# Patient Record
Sex: FEMALE | Race: WHITE | Employment: FULL TIME | URBAN - METROPOLITAN AREA
[De-identification: names, ages, dates, MRNs, and addresses within clinical notes are randomized per-mention and may not be internally consistent; named-entity substitution may affect disease eponyms.]

---

## 2023-06-27 ENCOUNTER — HOSPITAL ENCOUNTER (OUTPATIENT)
Dept: PHYSICAL THERAPY | Age: 28
Setting detail: RECURRING SERIES
Discharge: HOME | End: 2023-06-30
Payer: COMMERCIAL

## 2023-06-27 PROCEDURE — 97110 THERAPEUTIC EXERCISES: CPT

## 2023-06-27 PROCEDURE — 97163 PT EVAL HIGH COMPLEX 45 MIN: CPT

## 2023-06-30 ENCOUNTER — HOSPITAL ENCOUNTER (OUTPATIENT)
Dept: PHYSICAL THERAPY | Age: 28
Setting detail: RECURRING SERIES
End: 2023-06-30
Payer: COMMERCIAL

## 2023-06-30 PROCEDURE — 97110 THERAPEUTIC EXERCISES: CPT

## 2023-06-30 PROCEDURE — 97140 MANUAL THERAPY 1/> REGIONS: CPT

## 2023-06-30 PROCEDURE — 97116 GAIT TRAINING THERAPY: CPT

## 2023-07-05 ENCOUNTER — HOSPITAL ENCOUNTER (OUTPATIENT)
Dept: PHYSICAL THERAPY | Age: 28
Setting detail: RECURRING SERIES
Discharge: HOME | End: 2023-07-08
Payer: COMMERCIAL

## 2023-07-05 PROCEDURE — 97116 GAIT TRAINING THERAPY: CPT

## 2023-07-05 PROCEDURE — 97110 THERAPEUTIC EXERCISES: CPT

## 2023-07-05 PROCEDURE — 97140 MANUAL THERAPY 1/> REGIONS: CPT

## 2023-07-05 NOTE — PROGRESS NOTES
abbreviations that may be included in this note:  STM- Soft tissue mobilization, R>L or L>R- right greater than left or vice versa, HEP - Home exercise program, CPA/UPA - Central or unilateral posterior-anterior mobilization, SLS- single leg stance, SKTC - Single leg to chest, SNAGS/NAGS- (sustained) Natural apophyseal glides, TKE- Terminal knee extension, ER- External rotation, IR- Internal rotation, B - Bilateral, sec- seconds, Lb- pounds, min - minutes, HA- Headache, OP- Over pressure, tband- theraband, fwd/bwd- Forward/Backward, TA- Transversus Abdominus, dbl- Double      GAIT TRAINING: (8 minutes):    Gait training to improve and/or restore physical functioning as related to mobility, strength, balance, and coordination. Ambulated 100 feet (x2) with  B axillary crutches, knee brace locked  and minimal visual cueing, verbal cueing, and tactile cueing related to their stance phase, push off, heel strike, hip position and motion, and pelvis position and motion to promote proper body alignment, promote proper body posture, and promote proper body mechanics. TREATMENT/SESSION SUMMARY:     Response to Treatment:  Pt continues to have difficulty with quad activation. Able to assist with hip flexion in supine, PT providing ModA. Ambulated slower today to improve WB throughout gait phases. Swelling is reducing nicely. Communication/Consultation:  None today  Equipment provided today:  None  Recommendations/Intent for next treatment session: Next visit will focus on progressing strength and ROM.      Total Treatment Billable Duration:  53 minutes  Time In: 0830  Time Out: Wade Art PT         Charge Capture  }Post Session Pain  PT Visit Info  [address] [facility]  MD Guidelines  Scanned Media  Benefits  MyChart    Future Appointments   Date Time Provider [id number]   7/7/2023  8:30 AM Meche Rachel PT [facility]   7/11/2023  8:30 AM Meche Rachel PT [facility]   7/14/2023  8:30 AM

## 2023-07-07 ENCOUNTER — HOSPITAL ENCOUNTER (OUTPATIENT)
Dept: PHYSICAL THERAPY | Age: 28
Setting detail: RECURRING SERIES
Discharge: HOME | End: 2023-07-10
Payer: COMMERCIAL

## 2023-07-07 PROCEDURE — 97110 THERAPEUTIC EXERCISES: CPT

## 2023-07-07 PROCEDURE — 97140 MANUAL THERAPY 1/> REGIONS: CPT

## 2023-07-11 ENCOUNTER — HOSPITAL ENCOUNTER (OUTPATIENT)
Dept: PHYSICAL THERAPY | Age: 28
Setting detail: RECURRING SERIES
Discharge: HOME | End: 2023-07-14
Payer: COMMERCIAL

## 2023-07-11 PROCEDURE — 97110 THERAPEUTIC EXERCISES: CPT

## 2023-07-11 PROCEDURE — 97140 MANUAL THERAPY 1/> REGIONS: CPT

## 2023-07-11 NOTE — PROGRESS NOTES
notes she is moving back to [place] and will be switching PT facilities to one there. Notes Friday will be her last day here. States her knee is doing well with nothing new to report. >Initial:      3/10>Post Session:        3/10  Medications Last Reviewed:  7/11/2023  Updated Objective Findings:  None Today  Treatment   THERAPEUTIC EXERCISE: (40 minutes):  Exercises per grid below to improve mobility and strength. Required moderate visual, verbal, manual and tactile cues to promote proper body alignment, promote proper body posture and promote proper body mechanics. Progressed resistance, range, repetitions and complexity of movement as indicated. Date:  7/11/2023   Activity/Exercise Parameters      Treatment, home exercise plan, plan of care, prognosis, pain modulation   Ambulation    Prone knee hang 3min   Heel prop 3min   Heel slides in sitting    Standing heel raises 2x10   Standing quad set with ball 2x10   Standing gastroc stretch 2min   Heel slide in supine 1x15  To 90 degrees   Quad set in supine 8min  [place] ESTM concurrently   SAQ w/ 1.2 bolster 1x10  PT assist   Quad hyperextension in supine with towel under knee --   Supine hip flexion AAROM 5min  [place] ESTM concurrently  PT assist    Hamstring activation in sitting (heel digs)    [employer] Portal   Access Code: [email or web address]  URL: [email or web address]  Date: 06/27/2023  Prepared by: Escobar Shaikh    Exercises  - Supine Quad Set  - 2 x daily - 7 x weekly - 2 sets - 10 reps - 10s hold  - Seated Knee Flexion Extension AROM   - 2 x daily - 7 x weekly - 2 sets - 10 reps  - Seated Hamstring Set  - 2 x daily - 7 x weekly - 2 sets - 10 reps - 5s hold  - Supine Gluteal Sets  - 2 x daily - 7 x weekly - 2 sets - 10 reps  - Seated Ankle Pumps on Table  - 2 x daily - 7 x weekly - 2 sets - 10 reps  MANUAL THERAPY: (15 minutes): Joint mobilization, Soft tissue mobilization and Manipulation was utilized and necessary because of the

## 2023-07-14 ENCOUNTER — HOSPITAL ENCOUNTER (OUTPATIENT)
Dept: PHYSICAL THERAPY | Age: 28
Setting detail: RECURRING SERIES
Discharge: HOME | End: 2023-07-17
Payer: COMMERCIAL

## 2023-07-14 PROCEDURE — 97140 MANUAL THERAPY 1/> REGIONS: CPT

## 2023-07-14 PROCEDURE — 97110 THERAPEUTIC EXERCISES: CPT

## 2023-07-17 ENCOUNTER — HOSPITAL ENCOUNTER (OUTPATIENT)
Dept: PHYSICAL THERAPY | Age: 28
Setting detail: RECURRING SERIES
End: 2023-07-17
Payer: COMMERCIAL

## 2023-07-20 ENCOUNTER — APPOINTMENT (OUTPATIENT)
Dept: PHYSICAL THERAPY | Age: 28
End: 2023-07-20
Payer: COMMERCIAL

## 2023-07-25 ENCOUNTER — APPOINTMENT (OUTPATIENT)
Dept: PHYSICAL THERAPY | Age: 28
End: 2023-07-25
Payer: COMMERCIAL

## 2023-07-28 ENCOUNTER — APPOINTMENT (OUTPATIENT)
Dept: PHYSICAL THERAPY | Age: 28
End: 2023-07-28
Payer: COMMERCIAL